# Patient Record
Sex: MALE | Race: WHITE | Employment: OTHER | URBAN - NONMETROPOLITAN AREA
[De-identification: names, ages, dates, MRNs, and addresses within clinical notes are randomized per-mention and may not be internally consistent; named-entity substitution may affect disease eponyms.]

---

## 2019-01-03 ENCOUNTER — OFFICE VISIT (OUTPATIENT)
Dept: FAMILY MEDICINE CLINIC | Age: 34
End: 2019-01-03
Payer: COMMERCIAL

## 2019-01-03 VITALS
HEIGHT: 69 IN | DIASTOLIC BLOOD PRESSURE: 72 MMHG | HEART RATE: 85 BPM | TEMPERATURE: 97.8 F | OXYGEN SATURATION: 98 % | SYSTOLIC BLOOD PRESSURE: 116 MMHG | WEIGHT: 161 LBS | RESPIRATION RATE: 10 BRPM | BODY MASS INDEX: 23.85 KG/M2

## 2019-01-03 DIAGNOSIS — R09.81 SINUS CONGESTION: Primary | ICD-10-CM

## 2019-01-03 DIAGNOSIS — K59.01 SLOW TRANSIT CONSTIPATION: ICD-10-CM

## 2019-01-03 DIAGNOSIS — I88.9 LYMPHADENITIS: ICD-10-CM

## 2019-01-03 DIAGNOSIS — R09.81 NOSE CONGESTED: ICD-10-CM

## 2019-01-03 PROCEDURE — 99203 OFFICE O/P NEW LOW 30 MIN: CPT | Performed by: FAMILY MEDICINE

## 2019-01-03 ASSESSMENT — PATIENT HEALTH QUESTIONNAIRE - PHQ9
SUM OF ALL RESPONSES TO PHQ9 QUESTIONS 1 & 2: 0
SUM OF ALL RESPONSES TO PHQ QUESTIONS 1-9: 0
2. FEELING DOWN, DEPRESSED OR HOPELESS: 0
1. LITTLE INTEREST OR PLEASURE IN DOING THINGS: 0
SUM OF ALL RESPONSES TO PHQ QUESTIONS 1-9: 0

## 2019-01-03 ASSESSMENT — ENCOUNTER SYMPTOMS
RESPIRATORY NEGATIVE: 1
CONSTIPATION: 1
SINUS PAIN: 1

## 2019-01-04 LAB
CALCIUM SERPL-MCNC: 10.1 MG/DL (ref 8.5–10.5)
HIGH SENSITIVE C-REACTIVE PROTEIN: 0.16 MG/L
MAGNESIUM: 2.2 MG/DL (ref 1.6–2.6)
SEDIMENTATION RATE, ERYTHROCYTE: 1 MM/HR (ref 0–20)
T4 TOTAL: 6.4 UG/DL (ref 4.5–12)

## 2019-01-08 LAB
GLIADIN ANTIBODIES IGA: 1.7 U/ML
GLIADIN ANTIBODIES IGG: 1.6 U/ML
PARATHYROID HORMONE INTACT: 16 PG/ML (ref 11–67)
THYROID PEROXIDASE ANTIBODY: 1 IU/ML
VITAMIN D 25-HYDROXY: 29 NG/ML

## 2021-07-07 ENCOUNTER — OFFICE VISIT (OUTPATIENT)
Dept: PHYSICAL MEDICINE AND REHAB | Age: 36
End: 2021-07-07
Payer: COMMERCIAL

## 2021-07-07 VITALS
WEIGHT: 161 LBS | SYSTOLIC BLOOD PRESSURE: 108 MMHG | HEIGHT: 69 IN | BODY MASS INDEX: 23.85 KG/M2 | DIASTOLIC BLOOD PRESSURE: 68 MMHG

## 2021-07-07 DIAGNOSIS — M79.18 MUSCULOSKELETAL PAIN: ICD-10-CM

## 2021-07-07 DIAGNOSIS — G89.29 OTHER CHRONIC PAIN: ICD-10-CM

## 2021-07-07 DIAGNOSIS — T14.8XXA MUSCLE STRAIN: Primary | ICD-10-CM

## 2021-07-07 DIAGNOSIS — G89.29 CHRONIC LEFT-SIDED THORACIC BACK PAIN: ICD-10-CM

## 2021-07-07 DIAGNOSIS — M54.6 CHRONIC LEFT-SIDED THORACIC BACK PAIN: ICD-10-CM

## 2021-07-07 PROCEDURE — 20553 NJX 1/MLT TRIGGER POINTS 3/>: CPT | Performed by: ANESTHESIOLOGY

## 2021-07-07 PROCEDURE — 99203 OFFICE O/P NEW LOW 30 MIN: CPT | Performed by: ANESTHESIOLOGY

## 2021-07-07 RX ORDER — METHYLPREDNISOLONE ACETATE 40 MG/ML
40 INJECTION, SUSPENSION INTRA-ARTICULAR; INTRALESIONAL; INTRAMUSCULAR; SOFT TISSUE ONCE
Status: COMPLETED | OUTPATIENT
Start: 2021-07-07 | End: 2021-07-07

## 2021-07-07 RX ADMIN — METHYLPREDNISOLONE ACETATE 40 MG: 40 INJECTION, SUSPENSION INTRA-ARTICULAR; INTRALESIONAL; INTRAMUSCULAR; SOFT TISSUE at 15:05

## 2021-07-07 NOTE — PROGRESS NOTES
Pre-operative Diagnosis:  Myofascial pain     Post-operative Diagnosis: Myofascial pain     Procedure: Trigger point injection(s)     Procedure Description:  After having signed the informed consent, the patient was seated in a chair. The patient's left upper thoracic back region was prepped with alcohol wipes. Trigger points were identified in the left thoracic paraspinal muscles (T2-T5), left trapezius, and left Rhomboid for a total of 5 trigger point injections. After negative aspiration, 1 cc of a mixture containing 1:4 40 mg Depo-Medrol: 0.5% bupivacaine was injected at each trigger point for a total of 5 trigger points. The patient tolerated the procedure well.      Procedural Complications: None        Eugene Tapia,   Interventional Pain Management/PM&R   [place]

## 2021-07-07 NOTE — PROGRESS NOTES
Smoking status: Never Smoker    Smokeless tobacco: Never Used   Substance and Sexual Activity    Alcohol use: No    Drug use: No    Sexual activity: Not on file   Other Topics Concern    Not on file   Social History Narrative    Not on file     Social Determinants of Health     Financial Resource Strain:     Difficulty of Paying Living Expenses:    Food Insecurity:     Worried About Running Out of Food in the Last Year:     [address] in the Last Year:    Transportation Needs:     Lack of Transportation (Medical):      Lack of Transportation (Non-Medical):    Physical Activity:     Days of Exercise per Week:     Minutes of Exercise per Session:    Stress:     Feeling of Stress :    Social Connections:     Frequency of Communication with Friends and Family:     Frequency of Social Gatherings with Friends and Family:     Attends Orthodox Services:     Active Member of Clubs or Organizations:     Attends Club or Organization Meetings:     Marital Status:    Intimate Partner Violence:     Fear of Current or Ex-Partner:     Emotionally Abused:     Physically Abused:     Sexually Abused:        Medications & Allergies:   Current Outpatient Medications   Medication Instructions    Aspirin-Acetaminophen-Caffeine (EXCEDRIN MIGRAINE PO) 1 tablet, Oral, PRN       No Known Allergies    Review of Systems:   Constitutional: negative for weight changes or fevers  Genitourinary: negative for bowel/bladder incontinence   Musculoskeletal: positive for upper thoracic back pain  Neurological: negative for any arm weakness or numbness/tingling  Behavioral/Psych: negative for anxiety/depression   All other systems reviewed and are negative    Objective:     Vitals:    07/07/21 0806   BP: 108/68     Constitutional: Pleasant, no acute distress   Head: Normocephalic, atraumatic   Eyes: Conjunctivae normal   Neck: Supple, symmetrical   Respiration: Non-labored breathing   Cardiovascular: Limbs warm and well perfused   Musculoskeletal: Full cervical ROM in all planes. Negative Spurling maneuver bilaterally. No increased pain with facet loading. Tenderness palpation 1 inch lateral to T2 spinous process. Neuro: Alert, oriented. CN II-XII appear grossly intact. Motor strength 5/5 SAb, EF, EE, WE, Isaac. LT sensation intact in upper limbs. Biceps/triceps reflexes 2+ and symmetrical. Negative Andersen bilaterally. Skin: no skin rashes or lesions noted   Psychological: Cooperative, no exaggerated pain behaviors         Assessment:    Diagnosis Orders   1. Muscle strain     2. Chronic left-sided thoracic back pain     3. Musculoskeletal pain     4. Other chronic pain         Ranjan Saldivar is a 30 y.o.male presenting to the pain clinic for evaluation of upper thoracic back pain. His clinical history and physical examination are consistent with musculoskeletal type pain likely a strain in his left upper trapezius muscle at the origin around T2 level. He underwent trigger point injections today. Patient can add Voltaren gel topically to the location site. We will follow-up as needed. If patient fails injection medication therapy, we may order a thoracic spine x-ray to further investigate. Plan: The following treatment recommendations and plan were discussed in detail with Ranjan Saldivar. Imaging:   No imaging reviewed. May potentially investigate thoracic spine x-ray if medication and injection therapy fails. Analgesics:   Patient is taking ibuprofen. Patient is advised to not take this medication on empty stomach. I advised patient look into topical Voltaren gel to apply to the site. Adjuvants:   None    Interventions: With examination consistent with musculoskeletal/myofascial pain in the left upper thoracic region, the patient underwent trigger point injections today. (Please see attached procedure note).     Anticoagulation/NPO Recommendations:   None    Multidisciplinary Pain

## 2021-10-05 ENCOUNTER — HOSPITAL ENCOUNTER (OUTPATIENT)
Dept: MRI IMAGING | Age: 36
Discharge: HOME | End: 2021-10-05

## 2021-10-05 DIAGNOSIS — Z00.6 EXAMINATION FOR NORMAL COMPARISON FOR CLINICAL RESEARCH: ICD-10-CM

## 2021-11-22 ENCOUNTER — OFFICE VISIT (OUTPATIENT)
Dept: PHYSICAL MEDICINE AND REHAB | Age: 36
End: 2021-11-22
Payer: COMMERCIAL

## 2021-11-22 VITALS
WEIGHT: 161 LBS | DIASTOLIC BLOOD PRESSURE: 68 MMHG | BODY MASS INDEX: 23.85 KG/M2 | HEIGHT: 69 IN | SYSTOLIC BLOOD PRESSURE: 106 MMHG

## 2021-11-22 DIAGNOSIS — M79.18 MUSCULOSKELETAL PAIN: ICD-10-CM

## 2021-11-22 DIAGNOSIS — G24.3 CERVICAL DYSTONIA: ICD-10-CM

## 2021-11-22 DIAGNOSIS — G89.29 CHRONIC LEFT-SIDED THORACIC BACK PAIN: ICD-10-CM

## 2021-11-22 DIAGNOSIS — T14.8XXA MUSCLE STRAIN: ICD-10-CM

## 2021-11-22 DIAGNOSIS — G43.719 INTRACTABLE CHRONIC MIGRAINE WITHOUT AURA AND WITHOUT STATUS MIGRAINOSUS: Primary | ICD-10-CM

## 2021-11-22 DIAGNOSIS — G89.29 OTHER CHRONIC PAIN: ICD-10-CM

## 2021-11-22 DIAGNOSIS — M54.6 CHRONIC LEFT-SIDED THORACIC BACK PAIN: ICD-10-CM

## 2021-11-22 PROCEDURE — 64615 CHEMODENERV MUSC MIGRAINE: CPT | Performed by: ANESTHESIOLOGY

## 2021-11-22 PROCEDURE — 99213 OFFICE O/P EST LOW 20 MIN: CPT | Performed by: ANESTHESIOLOGY

## 2021-11-22 NOTE — PROGRESS NOTES
Pre-operative Diagnosis: Migraine headaches     Post-operative Diagnosis: Migraine headaches     Procedure: Xeomin for Migraine headaches     Procedure Description:  Patient was reclined on the examination table. Xeomin was drawn up into a tuberculin syringe, to a concentration of 5 units per 0.1 mL with a 30-gauge needle. Skin was prepped with alcohol wipes. The following muscles were injected after negative aspiration; The left rhomboids and left trapezius was injected a total of 5 locations. This was a total of 50 units. The patient tolerated the procedure well.     Medications: 1 mL in 50 U Xeomin drawn up in TB syringe  Xeomin sample used: Lot # [id number]; Exp 09/2023    Amount medication wasted: 0 units        Procedural Complications: None        Eugene Granados,   Interventional Pain Management/PM&R   [place]

## 2021-11-22 NOTE — PROGRESS NOTES
Chronic Pain/PM&R Clinic Note     Encounter Date: 11/22/21    Subjective:   Chief Complaint:   Chief Complaint   Patient presents with    Botox Injection     Sample       History of Present Illness:   Martha Pagan is a 30 y.o. male seen in the clinic initially on 07/07/21 upon request from Micheal Rapp MD (PCP) for his history of upper thoracic back pain and migraines. He describes upper thoracic back pain on the left side just lateral to his thoracic spine at the origin of his trapezius. He states that this pain has been present for several years and progressively getting worse. He denies any injury or inciting event that has caused this pain. He states that this pain can come and go but does get worse throughout the day. He describes it as a numb, aching pain. This pain is worse with any activities particularly with upper body lifts and at the end of his workday. He denies any symptoms of pain radiating down his arm but does state the pain can cause migraine headaches at the end of the day. He denies any focal arm weakness, arm paresthesias, balance/gait disturbance. He states his pain is improved with rest, stretching, chiropractor manipulation, and medications. He states that he has been using ibuprofen as needed and cryofreeze topical medication both of which helped. Today, 11/22/2021, patient presents for planned follow-up for management of thoracic back pain and migraines. He states that the trigger point injections performed on 7/7/2021 provided only about 1-2 weeks of relief. He states that he continues to have this \"numb\" pain that is worse as he keeps working throughout the day. He states that the other day it is typically worse than at the beginning a day and this can be associate with some migraine headaches. He denies any new symptoms at this point he denies any new medications at this point.     History of Interventions:   Surgery: No previous cervical surgeries  Injections: TPI file    Number of Places Lived in the Last Year: Not on file    Unstable Housing in the Last Year: Not on file       Medications & Allergies:   Current Outpatient Medications   Medication Instructions    Aspirin-Acetaminophen-Caffeine (EXCEDRIN MIGRAINE PO) 1 tablet, Oral, PRN       No Known Allergies    Review of Systems:   Constitutional: negative for weight changes or fevers  Genitourinary: negative for bowel/bladder incontinence   Musculoskeletal: positive for upper thoracic back pain  Neurological: negative for any arm weakness or numbness/tingling  Behavioral/Psych: negative for anxiety/depression   All other systems reviewed and are negative    Objective:     Vitals:    11/22/21 0739   BP: 106/68     Constitutional: Pleasant, no acute distress   Head: Normocephalic, atraumatic   Eyes: Conjunctivae normal   Neck: Supple, symmetrical   Respiration: Non-labored breathing   Cardiovascular: Limbs warm and well perfused   Musculoskeletal: Full cervical ROM in all planes. Negative Spurling maneuver bilaterally. No increased pain with facet loading. Tenderness palpation 1 inch lateral to T2 spinous process. Neuro: Alert, oriented. CN II-XII appear grossly intact. No focal motor deficits appreciated in upper limbs  Skin: no skin rashes or lesions noted   Psychological: Cooperative, no exaggerated pain behaviors         Assessment:    Diagnosis Orders   1. Intractable chronic migraine without aura and without status migrainosus     2. Musculoskeletal pain     3. Cervical dystonia     4. Chronic left-sided thoracic back pain     5. Muscle strain     6. Other chronic pain         Cristi Elkins is a 30 y.o.male presenting to the pain clinic for evaluation of upper thoracic back pain. His clinical history and physical examination are consistent with musculoskeletal type pain likely a strain in his left upper trapezius muscle at the origin around T2 level.       He failed to respond to trigger point injections for an extended duration of time and his MRI of the cervical spine was unremarkable. Patient underwent Xeomin injections for migraine headaches today. We will follow-up in 6 weeks for evaluation. Plan: The following treatment recommendations and plan were discussed in detail with Paz Matute. Imaging:   I reviewed the patient's MRI of cervical spine. Analgesics:   Patient is taking ibuprofen. Patient is advised to not take this medication on empty stomach. I advised patient look into topical Voltaren gel to apply to the site. Adjuvants:   None    Interventions: With examination consistent with migraine headaches, we proceeded with injections of Xeomin. The risks and benefits were discussed in detail with the patient. Patient underwent injections today. (Please see attached procedure note). Anticoagulation/NPO Recommendations:   None    Multidisciplinary Pain Management:   In the presence of complex, chronic, and multi-factorial pain, the importance of a multidisciplinary approach to pain management in the patients management regimen was emphasized and discussed in great detail.      Referrals:  None    Prescriptions Written This Visit:   None    Follow-up: 6 weeks (in [address])      Mani Todd DO  Interventional Pain Management/PM&R   [place]

## 2022-01-03 ENCOUNTER — OFFICE VISIT (OUTPATIENT)
Dept: PAIN MANAGEMENT | Age: 37
End: 2022-01-03
Payer: COMMERCIAL

## 2022-01-03 VITALS
WEIGHT: 161 LBS | BODY MASS INDEX: 23.85 KG/M2 | HEIGHT: 69 IN | DIASTOLIC BLOOD PRESSURE: 78 MMHG | HEART RATE: 76 BPM | SYSTOLIC BLOOD PRESSURE: 116 MMHG | OXYGEN SATURATION: 98 %

## 2022-01-03 DIAGNOSIS — M54.6 CHRONIC BILATERAL THORACIC BACK PAIN: Primary | ICD-10-CM

## 2022-01-03 DIAGNOSIS — G89.29 CHRONIC BILATERAL THORACIC BACK PAIN: Primary | ICD-10-CM

## 2022-01-03 DIAGNOSIS — G43.719 INTRACTABLE CHRONIC MIGRAINE WITHOUT AURA AND WITHOUT STATUS MIGRAINOSUS: ICD-10-CM

## 2022-01-03 DIAGNOSIS — G89.29 OTHER CHRONIC PAIN: ICD-10-CM

## 2022-01-03 DIAGNOSIS — R50.9 FEVER, UNSPECIFIED FEVER CAUSE: ICD-10-CM

## 2022-01-03 PROCEDURE — 99214 OFFICE O/P EST MOD 30 MIN: CPT | Performed by: ANESTHESIOLOGY

## 2022-01-03 NOTE — PROGRESS NOTES
Chronic Pain/PM&R Clinic Note     Encounter Date: 1/3/22    Subjective:   Chief Complaint:   Chief Complaint   Patient presents with    Follow-up     patient states botox worked for a short while and he is experiencing some numbness and pain again       History of Present Illness:   Glenn Hoffman is a 29 y.o. male seen in the clinic initially on 07/07/21 upon request from Enedina Farooq MD (PCP) for his history of upper thoracic back pain and migraines. He describes upper thoracic back pain on the left side just lateral to his thoracic spine at the origin of his trapezius. He states that this pain has been present for several years and progressively getting worse. He denies any injury or inciting event that has caused this pain. He states that this pain can come and go but does get worse throughout the day. He describes it as a numb, aching pain. This pain is worse with any activities particularly with upper body lifts and at the end of his workday. He denies any symptoms of pain radiating down his arm but does state the pain can cause migraine headaches at the end of the day. He denies any focal arm weakness, arm paresthesias, balance/gait disturbance. He states his pain is improved with rest, stretching, chiropractor manipulation, and medications. He states that he has been using ibuprofen as needed and cryofreeze topical medication both of which helped. Today, 1/3/2022, patient presents for planned follow-up for management of thoracic back pain and migraines. He underwent Xeomin injections on 11/22/2021. He states that after this injection he got about 90-95% relief in his upper thoracic back region and complete resolution of migraine headaches lasting for 6 weeks. He states he has noticed some gradual return of the pain he previously describes and migraines. He is wondering if he can repeat the injection because he had substantial relief.   He denies any changes in the presentation of his pain or migraine headaches. He states that he has been noticing left lower rib pain and is concerned about a kidney stone despite not having a kidney stones in the past.  He is wondering if he can have any lab work-ups to further evaluate. History of Interventions:   Surgery: No previous cervical surgeries  Injections: TPI (7/7/21) - 1-2 weeks significant relief   Xeomin inj (11/22/21) - 90-95% relief x 6 weeks    Current Treatment Medications:   Ibuprofen as needed  Cryofreeze as needed    Imaging:  None    History reviewed. No pertinent past medical history. Past Surgical History:   Procedure Laterality Date    VASECTOMY  06/2021       History reviewed. No pertinent family history. Social History     Socioeconomic History    Marital status:      Spouse name: Not on file    Number of children: Not on file    Years of education: Not on file    Highest education level: Not on file   Occupational History    Not on file   Tobacco Use    Smoking status: Never Smoker    Smokeless tobacco: Never Used   Substance and Sexual Activity    Alcohol use: No    Drug use: No    Sexual activity: Not on file   Other Topics Concern    Not on file   Social History Narrative    Not on file     Social Determinants of Health     Financial Resource Strain:     Difficulty of Paying Living Expenses: Not on file   Food Insecurity:     Worried About Running Out of Food in the Last Year: Not on file    Sarai of Food in the Last Year: Not on file   Transportation Needs:     Lack of Transportation (Medical): Not on file    Lack of Transportation (Non-Medical):  Not on file   Physical Activity:     Days of Exercise per Week: Not on file    Minutes of Exercise per Session: Not on file   Stress:     Feeling of Stress : Not on file   Social Connections:     Frequency of Communication with Friends and Family: Not on file    Frequency of Social Gatherings with Friends and Family: Not on file    Attends Hindu Services: Not on file    Active Member of Clubs or Organizations: Not on file    Attends Club or Organization Meetings: Not on file    Marital Status: Not on file   Intimate Partner Violence:     Fear of Current or Ex-Partner: Not on file    Emotionally Abused: Not on file    Physically Abused: Not on file    Sexually Abused: Not on file   Housing Stability:     Unable to Pay for Housing in the Last Year: Not on file    Number of [place] in the Last Year: Not on file    Unstable Housing in the Last Year: Not on file       Medications & Allergies:   Current Outpatient Medications   Medication Instructions    Aspirin-Acetaminophen-Caffeine (EXCEDRIN MIGRAINE PO) 1 tablet, Oral, PRN       No Known Allergies    Review of Systems:   Constitutional: negative for weight changes or fevers  Genitourinary: negative for bowel/bladder incontinence   Musculoskeletal: positive for upper thoracic back pain  Neurological: negative for any arm weakness or numbness/tingling  Behavioral/Psych: negative for anxiety/depression   All other systems reviewed and are negative    Objective:     Vitals:    01/03/22 0807   BP: 116/78   Pulse: 76   SpO2: 98%     Constitutional: Pleasant, no acute distress   Head: Normocephalic, atraumatic   Eyes: Conjunctivae normal   Neck: Supple, symmetrical   Respiration: Non-labored breathing   Cardiovascular: Limbs warm and well perfused   Musculoskeletal: Full cervical ROM in all planes. Negative Spurling maneuver bilaterally. No increased pain with facet loading. Tenderness palpation 1 inch lateral to T2 spinous process. Neuro: Alert, oriented. CN II-XII appear grossly intact. No focal motor deficits appreciated in upper limbs  Skin: no skin rashes or lesions noted   Psychological: Cooperative, no exaggerated pain behaviors         Assessment:    Diagnosis Orders   1. Chronic bilateral thoracic back pain     2.  Fever, unspecified fever cause  CBC With Auto Differential    Comprehensive Metabolic Panel   3. Other chronic pain     4. Intractable chronic migraine without aura and without status migrainosus         Dayami Flynn is a 29 y. o.male presenting to the pain clinic for evaluation of upper thoracic back pain. His clinical history and physical examination are consistent with musculoskeletal type pain likely a strain in his left upper trapezius muscle at the origin around T2 level. He failed to respond to trigger point injections for an extended duration of time and his MRI of the cervical spine was unremarkable. He responded significantly to his Xeomin injection for management of migraine headaches. I have set him up for 6-week follow-up for repeat Xeomin injections. Plan: The following treatment recommendations and plan were discussed in detail with Dayami Flynn. Imaging/Labs:   I reviewed the patient's MRI of cervical spine. I have ordered a CBC and CMP for eval of UTI. Analgesics:   Patient is taking ibuprofen. Patient is advised to not take this medication on empty stomach. I advised patient look into topical Voltaren gel to apply to the site. Adjuvants:   None    Interventions: With examination consistent with migraine headaches, we proceeded with injections of Xeomin. The risks and benefits were discussed in detail with the patient. Patient underwent injections today. (Please see attached procedure note). Anticoagulation/NPO Recommendations:   None    Multidisciplinary Pain Management:   In the presence of complex, chronic, and multi-factorial pain, the importance of a multidisciplinary approach to pain management in the patients management regimen was emphasized and discussed in great detail.      Referrals:  None    Prescriptions Written This Visit:   None    Follow-up: 6 weeks (for repeat Xeomin inj)    Nette Perez DO  Interventional Pain Management/PM&R   [place]

## 2022-02-21 ENCOUNTER — OFFICE VISIT (OUTPATIENT)
Dept: PHYSICAL MEDICINE AND REHAB | Age: 37
End: 2022-02-21
Payer: COMMERCIAL

## 2022-02-21 VITALS
BODY MASS INDEX: 23.85 KG/M2 | OXYGEN SATURATION: 100 % | HEIGHT: 69 IN | DIASTOLIC BLOOD PRESSURE: 70 MMHG | SYSTOLIC BLOOD PRESSURE: 104 MMHG | HEART RATE: 79 BPM | WEIGHT: 161 LBS

## 2022-02-21 DIAGNOSIS — G24.3 CERVICAL DYSTONIA: ICD-10-CM

## 2022-02-21 DIAGNOSIS — M54.6 CHRONIC LEFT-SIDED THORACIC BACK PAIN: ICD-10-CM

## 2022-02-21 DIAGNOSIS — M79.18 MUSCULOSKELETAL PAIN: ICD-10-CM

## 2022-02-21 DIAGNOSIS — G89.29 CHRONIC LEFT-SIDED THORACIC BACK PAIN: ICD-10-CM

## 2022-02-21 DIAGNOSIS — G43.719 INTRACTABLE CHRONIC MIGRAINE WITHOUT AURA AND WITHOUT STATUS MIGRAINOSUS: Primary | ICD-10-CM

## 2022-02-21 PROCEDURE — 64615 CHEMODENERV MUSC MIGRAINE: CPT | Performed by: ANESTHESIOLOGY

## 2022-02-21 PROCEDURE — 99213 OFFICE O/P EST LOW 20 MIN: CPT | Performed by: ANESTHESIOLOGY

## 2022-02-21 NOTE — PROGRESS NOTES
Pre-operative Diagnosis: Migraine headaches     Post-operative Diagnosis: Migraine headaches     Procedure: Xeomin for Migraine headaches     Procedure Description:  Patient was reclined on the examination table. Xeomin was drawn up into a tuberculin syringe, to a concentration of 5 units per 0.1 mL with a 30-gauge needle. Skin was prepped with alcohol wipes. The following muscles were injected after negative aspiration; The left rhomboids and left trapezius was injected a total of 5 locations. This was a total of 50 units. The patient tolerated the procedure well.     Medications: 1 mL in 50 U Xeomin drawn up in TB syringe  Xeomin sample used: Lot# - [id number]  Exp - 08/2023    Amount medication wasted: 0 units        Procedural Complications: None        Eugene Slade DO  Interventional Pain Management/PM&R   [place]

## 2022-02-21 NOTE — PROGRESS NOTES
Chronic Pain/PM&R Clinic Note     Encounter Date: 2/21/22    Subjective:   Chief Complaint:   Chief Complaint   Patient presents with    Botox Injection     50 units        History of Present Illness:   Alen Gay is a 29 y.o. male seen in the clinic initially on 07/07/21 upon request from Joselito Edmonds MD (PCP) for his history of upper thoracic back pain and migraines. He describes upper thoracic back pain on the left side just lateral to his thoracic spine at the origin of his trapezius. He states that this pain has been present for several years and progressively getting worse. He denies any injury or inciting event that has caused this pain. He states that this pain can come and go but does get worse throughout the day. He describes it as a numb, aching pain. This pain is worse with any activities particularly with upper body lifts and at the end of his workday. He denies any symptoms of pain radiating down his arm but does state the pain can cause migraine headaches at the end of the day. He denies any focal arm weakness, arm paresthesias, balance/gait disturbance. He states his pain is improved with rest, stretching, chiropractor manipulation, and medications. He states that he has been using ibuprofen as needed and cryofreeze topical medication both of which helped. Today, 2/21/2022, patient presents for planned follow-up for management of thoracic back pain and migraines. He underwent Xeomin injections on 11/22/2021. He reports greater than 90-95% reduction in his upper thoracic back pain and greater than 90% reduction in his migraine headaches lasting for about 12 weeks duration. Overall, he states he is very happy with his results from this. He denies any new symptoms at this point. He would like to repeat his Xeomin injections today. He continues to see a chiropractor for manipulation and a  for his upper back workouts.     History of Interventions:   Surgery: No previous cervical surgeries  Injections: TPI (7/7/21) - 1-2 weeks significant relief   Xeomin inj (11/22/21) - 95% relief x 12 weeks    Current Treatment Medications:   Ibuprofen as needed  Cryofreeze as needed    No past medical history on file. Past Surgical History:   Procedure Laterality Date    VASECTOMY  06/2021       No family history on file. Social History     Socioeconomic History    Marital status:      Spouse name: Not on file    Number of children: Not on file    Years of education: Not on file    Highest education level: Not on file   Occupational History    Not on file   Tobacco Use    Smoking status: Never Smoker    Smokeless tobacco: Never Used   Substance and Sexual Activity    Alcohol use: No    Drug use: No    Sexual activity: Not on file   Other Topics Concern    Not on file   Social History Narrative    Not on file     Social Determinants of Health     Financial Resource Strain:     Difficulty of Paying Living Expenses: Not on file   Food Insecurity:     Worried About Running Out of Food in the Last Year: Not on file    Sarai of Food in the Last Year: Not on file   Transportation Needs:     Lack of Transportation (Medical): Not on file    Lack of Transportation (Non-Medical):  Not on file   Physical Activity:     Days of Exercise per Week: Not on file    Minutes of Exercise per Session: Not on file   Stress:     Feeling of Stress : Not on file   Social Connections:     Frequency of Communication with Friends and Family: Not on file    Frequency of Social Gatherings with Friends and Family: Not on file    Attends Baptism Services: Not on file    Active Member of Clubs or Organizations: Not on file    Attends Club or Organization Meetings: Not on file    Marital Status: Not on file   Intimate Partner Violence:     Fear of Current or Ex-Partner: Not on file    Emotionally Abused: Not on file    Physically Abused: Not on file    Sexually Abused: Not on file at the origin around T2 level. He failed to respond to trigger point injections for an extended duration of time and his MRI of the cervical spine was unremarkable. He responded significantly to his Xeomin injection for management of migraine headaches and underwent repeat Xeomin injections today. Plan: The following treatment recommendations and plan were discussed in detail with Roxann High. Imaging/Labs:   I reviewed the patient's MRI of cervical spine. Analgesics:   Patient is taking ibuprofen. Patient is advised to not take this medication on empty stomach. I advised patient look into topical Voltaren gel to apply to the site. Adjuvants:   None    Interventions: With examination consistent with migraine headaches, we proceeded with injections of Xeomin. The risks and benefits were discussed in detail with the patient. Patient underwent injections today. (Please see attached procedure note). Anticoagulation/NPO Recommendations:   None    Multidisciplinary Pain Management:   In the presence of complex, chronic, and multi-factorial pain, the importance of a multidisciplinary approach to pain management in the patients management regimen was emphasized and discussed in great detail.      Referrals:  None    Prescriptions Written This Visit:   None    Follow-up: 12 weeks repeat Xeomin inj    Eugene Marti DO  Interventional Pain Management/PM&R   [place]

## 2022-03-29 ENCOUNTER — TELEPHONE (OUTPATIENT)
Dept: PHYSICAL MEDICINE AND REHAB | Age: 37
End: 2022-03-29

## 2022-03-29 NOTE — TELEPHONE ENCOUNTER
Returned call to patient regarding insurance left msg on 3/28 to call back and tried to call and left another msg today 3/29.

## 2022-03-31 ENCOUNTER — TELEPHONE (OUTPATIENT)
Dept: PHYSICAL MEDICINE AND REHAB | Age: 37
End: 2022-03-31

## 2022-03-31 NOTE — TELEPHONE ENCOUNTER
Called patient again regarding having a copy of insurance card faxed to me and informed him from our previous conversation no one has contacted me to get that copy of card

## 2022-07-15 ENCOUNTER — OFFICE VISIT (OUTPATIENT)
Dept: PHYSICAL MEDICINE AND REHAB | Age: 37
End: 2022-07-15
Payer: COMMERCIAL

## 2022-07-15 VITALS
HEIGHT: 69 IN | BODY MASS INDEX: 23.85 KG/M2 | WEIGHT: 161 LBS | DIASTOLIC BLOOD PRESSURE: 68 MMHG | SYSTOLIC BLOOD PRESSURE: 102 MMHG

## 2022-07-15 DIAGNOSIS — M54.6 CHRONIC LEFT-SIDED THORACIC BACK PAIN: Primary | ICD-10-CM

## 2022-07-15 DIAGNOSIS — G89.29 CHRONIC LEFT-SIDED THORACIC BACK PAIN: Primary | ICD-10-CM

## 2022-07-15 PROCEDURE — 64646 CHEMODENERV TRUNK MUSC 1-5: CPT | Performed by: ANESTHESIOLOGY

## 2022-07-15 NOTE — PROGRESS NOTES
Pre-operative Diagnosis: Posterior Thoracic Pain/Spasms     Post-operative Diagnosis: Posterior Thoracic Pain/Spasms     Procedure: Botox for thoracic back pain     Procedure Description:  Patient was reclined on the examination table. Botox was drawn up into a tuberculin syringe, to a concentration of 5 units per 0.1 mL with a 30-gauge needle. Skin was prepped with alcohol wipes. The following muscles were injected after negative aspiration; The left rhomboids, left trapezius, and left erector spinae muscles. This was a total of 125 units. The patient tolerated the procedure well.     Medications: 1 mL in 50 U Botox drawn up in TB syringe    Amount medication wasted: 75 units        Procedural Complications: None        Eugene Marti DO  Interventional Pain Management/PM&R   [place]